# Patient Record
Sex: MALE | Race: WHITE
[De-identification: names, ages, dates, MRNs, and addresses within clinical notes are randomized per-mention and may not be internally consistent; named-entity substitution may affect disease eponyms.]

---

## 2017-08-28 ENCOUNTER — MEDICATION RENEWAL (OUTPATIENT)
Age: 52
End: 2017-08-28

## 2017-08-29 LAB
ALBUMIN SERPL ELPH-MCNC: 4.3 G/DL
ALP BLD-CCNC: 37 U/L
ALT SERPL-CCNC: 25 U/L
ANION GAP SERPL CALC-SCNC: 15 MMOL/L
AST SERPL-CCNC: 29 U/L
BASOPHILS # BLD AUTO: 0.02 K/UL
BASOPHILS NFR BLD AUTO: 0.4 %
BILIRUB SERPL-MCNC: 0.6 MG/DL
BUN SERPL-MCNC: 25 MG/DL
CALCIUM SERPL-MCNC: 10 MG/DL
CHLORIDE SERPL-SCNC: 103 MMOL/L
CHOLEST SERPL-MCNC: 163 MG/DL
CHOLEST/HDLC SERPL: 3.8 RATIO
CO2 SERPL-SCNC: 21 MMOL/L
CREAT SERPL-MCNC: 1.17 MG/DL
EOSINOPHIL # BLD AUTO: 0.14 K/UL
EOSINOPHIL NFR BLD AUTO: 2.8 %
GLUCOSE SERPL-MCNC: 91 MG/DL
HBA1C MFR BLD HPLC: 5.6 %
HCT VFR BLD CALC: 41.6 %
HDLC SERPL-MCNC: 43 MG/DL
HGB BLD-MCNC: 14.3 G/DL
IMM GRANULOCYTES NFR BLD AUTO: 0.2 %
LDLC SERPL CALC-MCNC: 102 MG/DL
LYMPHOCYTES # BLD AUTO: 1.38 K/UL
LYMPHOCYTES NFR BLD AUTO: 27.1 %
MAN DIFF?: NORMAL
MCHC RBC-ENTMCNC: 29.6 PG
MCHC RBC-ENTMCNC: 34.4 GM/DL
MCV RBC AUTO: 86.1 FL
MONOCYTES # BLD AUTO: 0.58 K/UL
MONOCYTES NFR BLD AUTO: 11.4 %
NEUTROPHILS # BLD AUTO: 2.96 K/UL
NEUTROPHILS NFR BLD AUTO: 58.1 %
PLATELET # BLD AUTO: 218 K/UL
POTASSIUM SERPL-SCNC: 4.5 MMOL/L
PROT SERPL-MCNC: 7.5 G/DL
PSA SERPL-MCNC: 0.51 NG/ML
RBC # BLD: 4.83 M/UL
RBC # FLD: 12.7 %
SODIUM SERPL-SCNC: 139 MMOL/L
TRIGL SERPL-MCNC: 88 MG/DL
WBC # FLD AUTO: 5.09 K/UL

## 2017-08-31 ENCOUNTER — RESULT CHARGE (OUTPATIENT)
Age: 52
End: 2017-08-31

## 2017-08-31 ENCOUNTER — NON-APPOINTMENT (OUTPATIENT)
Age: 52
End: 2017-08-31

## 2017-08-31 ENCOUNTER — APPOINTMENT (OUTPATIENT)
Dept: CARDIOLOGY | Facility: CLINIC | Age: 52
End: 2017-08-31
Payer: COMMERCIAL

## 2017-08-31 VITALS
SYSTOLIC BLOOD PRESSURE: 121 MMHG | BODY MASS INDEX: 33.75 KG/M2 | HEART RATE: 78 BPM | HEIGHT: 65.5 IN | OXYGEN SATURATION: 95 % | DIASTOLIC BLOOD PRESSURE: 78 MMHG | WEIGHT: 205 LBS

## 2017-08-31 PROCEDURE — 99214 OFFICE O/P EST MOD 30 MIN: CPT

## 2017-08-31 PROCEDURE — 93000 ELECTROCARDIOGRAM COMPLETE: CPT

## 2018-02-15 ENCOUNTER — CHART COPY (OUTPATIENT)
Age: 53
End: 2018-02-15

## 2018-09-17 ENCOUNTER — NON-APPOINTMENT (OUTPATIENT)
Age: 53
End: 2018-09-17

## 2018-09-17 ENCOUNTER — APPOINTMENT (OUTPATIENT)
Dept: CARDIOLOGY | Facility: CLINIC | Age: 53
End: 2018-09-17
Payer: COMMERCIAL

## 2018-09-17 VITALS
SYSTOLIC BLOOD PRESSURE: 144 MMHG | DIASTOLIC BLOOD PRESSURE: 90 MMHG | HEIGHT: 65.5 IN | OXYGEN SATURATION: 96 % | WEIGHT: 206 LBS | HEART RATE: 87 BPM | BODY MASS INDEX: 33.91 KG/M2

## 2018-09-17 PROCEDURE — 99214 OFFICE O/P EST MOD 30 MIN: CPT

## 2018-09-17 PROCEDURE — 93000 ELECTROCARDIOGRAM COMPLETE: CPT

## 2018-09-18 LAB
ALBUMIN SERPL ELPH-MCNC: 4.5 G/DL
ALP BLD-CCNC: 42 U/L
ALT SERPL-CCNC: 25 U/L
ANION GAP SERPL CALC-SCNC: 11 MMOL/L
AST SERPL-CCNC: 27 U/L
BILIRUB SERPL-MCNC: 0.4 MG/DL
BUN SERPL-MCNC: 16 MG/DL
CALCIUM SERPL-MCNC: 9.5 MG/DL
CHLORIDE SERPL-SCNC: 106 MMOL/L
CHOLEST SERPL-MCNC: 181 MG/DL
CHOLEST/HDLC SERPL: 4.2 RATIO
CO2 SERPL-SCNC: 25 MMOL/L
CREAT SERPL-MCNC: 1.05 MG/DL
GLUCOSE SERPL-MCNC: 123 MG/DL
HDLC SERPL-MCNC: 43 MG/DL
LDLC SERPL CALC-MCNC: 90 MG/DL
POTASSIUM SERPL-SCNC: 4.8 MMOL/L
PROT SERPL-MCNC: 7.5 G/DL
PSA FREE FLD-MCNC: 26.6
PSA FREE SERPL-MCNC: 0.21 NG/ML
PSA SERPL-MCNC: 0.79 NG/ML
SODIUM SERPL-SCNC: 142 MMOL/L
TRIGL SERPL-MCNC: 239 MG/DL

## 2018-10-10 ENCOUNTER — APPOINTMENT (OUTPATIENT)
Dept: CARDIOLOGY | Facility: CLINIC | Age: 53
End: 2018-10-10
Payer: COMMERCIAL

## 2018-10-10 PROCEDURE — 93306 TTE W/DOPPLER COMPLETE: CPT

## 2019-05-10 ENCOUNTER — OTHER (OUTPATIENT)
Age: 54
End: 2019-05-10

## 2019-06-28 ENCOUNTER — OTHER (OUTPATIENT)
Age: 54
End: 2019-06-28

## 2019-11-22 ENCOUNTER — OTHER (OUTPATIENT)
Age: 54
End: 2019-11-22

## 2019-12-16 ENCOUNTER — APPOINTMENT (OUTPATIENT)
Dept: CARDIOLOGY | Facility: CLINIC | Age: 54
End: 2019-12-16
Payer: COMMERCIAL

## 2019-12-16 ENCOUNTER — NON-APPOINTMENT (OUTPATIENT)
Age: 54
End: 2019-12-16

## 2019-12-16 VITALS — DIASTOLIC BLOOD PRESSURE: 78 MMHG | SYSTOLIC BLOOD PRESSURE: 113 MMHG | HEART RATE: 97 BPM | OXYGEN SATURATION: 97 %

## 2019-12-16 PROCEDURE — 93000 ELECTROCARDIOGRAM COMPLETE: CPT

## 2019-12-16 PROCEDURE — 99214 OFFICE O/P EST MOD 30 MIN: CPT

## 2020-08-07 ENCOUNTER — RX RENEWAL (OUTPATIENT)
Age: 55
End: 2020-08-07

## 2020-11-06 ENCOUNTER — APPOINTMENT (OUTPATIENT)
Dept: CARDIOLOGY | Facility: CLINIC | Age: 55
End: 2020-11-06
Payer: COMMERCIAL

## 2020-11-06 ENCOUNTER — NON-APPOINTMENT (OUTPATIENT)
Age: 55
End: 2020-11-06

## 2020-11-06 VITALS
WEIGHT: 212 LBS | HEART RATE: 86 BPM | DIASTOLIC BLOOD PRESSURE: 79 MMHG | BODY MASS INDEX: 34.9 KG/M2 | OXYGEN SATURATION: 98 % | HEIGHT: 65.5 IN | SYSTOLIC BLOOD PRESSURE: 129 MMHG

## 2020-11-06 PROCEDURE — 93000 ELECTROCARDIOGRAM COMPLETE: CPT

## 2020-11-06 PROCEDURE — 99214 OFFICE O/P EST MOD 30 MIN: CPT

## 2020-11-06 PROCEDURE — 99072 ADDL SUPL MATRL&STAF TM PHE: CPT

## 2020-11-07 LAB
ALBUMIN SERPL ELPH-MCNC: 4.7 G/DL
ALP BLD-CCNC: 50 U/L
ALT SERPL-CCNC: 33 U/L
ANION GAP SERPL CALC-SCNC: 11 MMOL/L
AST SERPL-CCNC: 28 U/L
BILIRUB SERPL-MCNC: 0.4 MG/DL
BUN SERPL-MCNC: 20 MG/DL
CALCIUM SERPL-MCNC: 9.8 MG/DL
CHLORIDE SERPL-SCNC: 105 MMOL/L
CHOLEST SERPL-MCNC: 105 MG/DL
CO2 SERPL-SCNC: 22 MMOL/L
CREAT SERPL-MCNC: 1.03 MG/DL
ESTIMATED AVERAGE GLUCOSE: 120 MG/DL
GLUCOSE SERPL-MCNC: 95 MG/DL
HBA1C MFR BLD HPLC: 5.8 %
HDLC SERPL-MCNC: 41 MG/DL
LDLC SERPL CALC-MCNC: 49 MG/DL
NONHDLC SERPL-MCNC: 64 MG/DL
POTASSIUM SERPL-SCNC: 4.5 MMOL/L
PROT SERPL-MCNC: 7.2 G/DL
PSA FREE FLD-MCNC: 33 %
PSA FREE SERPL-MCNC: 0.23 NG/ML
PSA SERPL-MCNC: 0.68 NG/ML
SODIUM SERPL-SCNC: 139 MMOL/L
TRIGL SERPL-MCNC: 77 MG/DL
TSH SERPL-ACNC: 1.59 UIU/ML

## 2021-12-13 ENCOUNTER — APPOINTMENT (OUTPATIENT)
Dept: OTOLARYNGOLOGY | Facility: CLINIC | Age: 56
End: 2021-12-13
Payer: COMMERCIAL

## 2021-12-13 VITALS
HEIGHT: 66 IN | SYSTOLIC BLOOD PRESSURE: 154 MMHG | DIASTOLIC BLOOD PRESSURE: 94 MMHG | HEART RATE: 75 BPM | BODY MASS INDEX: 31.34 KG/M2 | WEIGHT: 195 LBS

## 2021-12-13 DIAGNOSIS — H61.21 IMPACTED CERUMEN, RIGHT EAR: ICD-10-CM

## 2021-12-13 DIAGNOSIS — G50.1 ATYPICAL FACIAL PAIN: ICD-10-CM

## 2021-12-13 PROCEDURE — 31231 NASAL ENDOSCOPY DX: CPT

## 2021-12-13 PROCEDURE — 99245 OFF/OP CONSLTJ NEW/EST HI 55: CPT | Mod: 25

## 2021-12-13 PROCEDURE — 69210 REMOVE IMPACTED EAR WAX UNI: CPT

## 2021-12-13 NOTE — CONSULT LETTER
[Dear  ___] : Dear  [unfilled], [Consult Letter:] : I had the pleasure of evaluating your patient, [unfilled]. [Please see my note below.] : Please see my note below. [Consult Closing:] : Thank you very much for allowing me to participate in the care of this patient.  If you have any questions, please do not hesitate to contact me. [Sincerely,] : Sincerely, [FreeTextEntry3] : Rashel Nash MD FACS

## 2021-12-13 NOTE — ASSESSMENT
[FreeTextEntry1] : Reviewed and reconciled medications, allergies, PMHx, PSHx, SocHx, FMHx. \par \par h/o dysphagia\par right cerumen impaction \par Polyp coming out of right maxillary accessory ostium\par \par CT sinus 9/30/21: minimal mucosal thickening bilaterally in frontal sinus. minimally scattered mucosal thickening bilateral. completely opacified on the right with a polypoid component extending into the nasal cavity to the level of the nasopharynx, compatible with an antrochoanal polyp. left Tre's cell. \par \par Plan:\par Cerumen removed. Reviewed CT sinus. Rigid Nasal Endoscopy. Discussed r/b/a of right maxillary antrostomy with removal of antrochoanal polyp.

## 2021-12-13 NOTE — REVIEW OF SYSTEMS
[Seasonal Allergies] : seasonal allergies [Post Nasal Drip] : post nasal drip [Negative] : Heme/Lymph

## 2021-12-13 NOTE — PROCEDURE
[FreeTextEntry6] : Procedure: Rigid Nasal Endoscopy: Risks, benefits, and alternatives of rigid endoscopy were explained to the patient. The patient gave oral consent to proceed. The rigid scope was inserted into the right nasal cavity. Endoscopy of the inferior and middle meatus was performed. Mild deviation of septum. Polyp coming out of maxillary accessory ostium. Turbinates were without mass. The procedure was repeated on the contralateral side finding deviated septum.

## 2021-12-13 NOTE — HISTORY OF PRESENT ILLNESS
[de-identified] : The patient presents with h/o dysphagia. Pt presents to the ED for consultation for nasal polyp. Pt reports that he saw another ENT for an episode of short lived dysphagia that has since resolved. During the work up for the dysphagia, ENT incidentally found pt had nasal polyps. Pt reports that he had no trouble breathing through nose. Denies having cheek pain or pressure.

## 2021-12-13 NOTE — PHYSICAL EXAM
[] : septum deviated bilaterally [Normal] : mucosa is normal [Midline] : trachea located in midline position [Hearing Loss Right Only] : normal [Hearing Loss Left Only] : normal [FreeTextEntry8] : Cerumen impaction removed via curettage.  [FreeTextEntry9] : Cerumen removed via curettage.  [FreeTextEntry2] : Sinuses nontender to percussion.

## 2021-12-13 NOTE — ADDENDUM
[FreeTextEntry1] : Documented by Siddharth Corea acting as scribe for Dr. Nash on 12/13/2021.\par \par All Medical record entries made by the Scribe were at my, Dr. Nash, direction and personally dictated by me on 12/13/2021. I have reviewed the chart and agree that the record accurately reflects my personal performance of the history, physical exam, assessment and plan. I have also personally directed, reviewed, and agreed with the discharge instructions.

## 2022-01-31 ENCOUNTER — NON-APPOINTMENT (OUTPATIENT)
Age: 57
End: 2022-01-31

## 2022-01-31 ENCOUNTER — APPOINTMENT (OUTPATIENT)
Dept: CARDIOLOGY | Facility: CLINIC | Age: 57
End: 2022-01-31
Payer: COMMERCIAL

## 2022-01-31 VITALS
BODY MASS INDEX: 32.78 KG/M2 | HEART RATE: 73 BPM | WEIGHT: 204 LBS | DIASTOLIC BLOOD PRESSURE: 80 MMHG | HEIGHT: 66 IN | OXYGEN SATURATION: 97 % | SYSTOLIC BLOOD PRESSURE: 128 MMHG

## 2022-01-31 DIAGNOSIS — I25.10 ATHEROSCLEROTIC HEART DISEASE OF NATIVE CORONARY ARTERY W/OUT ANGINA PECTORIS: ICD-10-CM

## 2022-01-31 DIAGNOSIS — I10 ESSENTIAL (PRIMARY) HYPERTENSION: ICD-10-CM

## 2022-01-31 DIAGNOSIS — E78.5 HYPERLIPIDEMIA, UNSPECIFIED: ICD-10-CM

## 2022-01-31 PROCEDURE — 93000 ELECTROCARDIOGRAM COMPLETE: CPT

## 2022-01-31 PROCEDURE — 99214 OFFICE O/P EST MOD 30 MIN: CPT

## 2022-02-01 LAB
ALBUMIN SERPL ELPH-MCNC: 4.8 G/DL
ALP BLD-CCNC: 52 U/L
ALT SERPL-CCNC: 29 U/L
ANION GAP SERPL CALC-SCNC: 10 MMOL/L
AST SERPL-CCNC: 30 U/L
BILIRUB SERPL-MCNC: 0.5 MG/DL
BUN SERPL-MCNC: 18 MG/DL
CALCIUM SERPL-MCNC: 10.1 MG/DL
CHLORIDE SERPL-SCNC: 106 MMOL/L
CHOLEST SERPL-MCNC: 118 MG/DL
CO2 SERPL-SCNC: 25 MMOL/L
CREAT SERPL-MCNC: 1.08 MG/DL
ESTIMATED AVERAGE GLUCOSE: 114 MG/DL
GLUCOSE SERPL-MCNC: 109 MG/DL
HBA1C MFR BLD HPLC: 5.6 %
HDLC SERPL-MCNC: 45 MG/DL
LDLC SERPL CALC-MCNC: 58 MG/DL
NONHDLC SERPL-MCNC: 73 MG/DL
POTASSIUM SERPL-SCNC: 4.8 MMOL/L
PROT SERPL-MCNC: 7.5 G/DL
SODIUM SERPL-SCNC: 141 MMOL/L
TRIGL SERPL-MCNC: 72 MG/DL

## 2022-02-03 ENCOUNTER — APPOINTMENT (OUTPATIENT)
Dept: OTOLARYNGOLOGY | Facility: AMBULATORY MEDICAL SERVICES | Age: 57
End: 2022-02-03
Payer: COMMERCIAL

## 2022-02-03 ENCOUNTER — RESULT REVIEW (OUTPATIENT)
Age: 57
End: 2022-02-03

## 2022-02-03 PROCEDURE — 31267 ENDOSCOPY MAXILLARY SINUS: CPT | Mod: RT

## 2022-02-07 ENCOUNTER — RX RENEWAL (OUTPATIENT)
Age: 57
End: 2022-02-07

## 2022-02-11 ENCOUNTER — APPOINTMENT (OUTPATIENT)
Dept: OTOLARYNGOLOGY | Facility: CLINIC | Age: 57
End: 2022-02-11

## 2022-02-25 ENCOUNTER — APPOINTMENT (OUTPATIENT)
Dept: OTOLARYNGOLOGY | Facility: CLINIC | Age: 57
End: 2022-02-25
Payer: COMMERCIAL

## 2022-02-25 VITALS
DIASTOLIC BLOOD PRESSURE: 81 MMHG | BODY MASS INDEX: 32.78 KG/M2 | HEART RATE: 98 BPM | WEIGHT: 204 LBS | SYSTOLIC BLOOD PRESSURE: 136 MMHG | HEIGHT: 66 IN

## 2022-02-25 DIAGNOSIS — R06.83 SNORING: ICD-10-CM

## 2022-02-25 PROCEDURE — 31237 NSL/SINS NDSC SURG BX POLYPC: CPT

## 2022-02-25 PROCEDURE — 99213 OFFICE O/P EST LOW 20 MIN: CPT | Mod: 25

## 2022-02-25 RX ORDER — CEPHALEXIN 500 MG/1
500 CAPSULE ORAL
Qty: 20 | Refills: 0 | Status: COMPLETED | COMMUNITY
Start: 2022-02-03 | End: 2022-02-25

## 2022-02-25 RX ORDER — ACETAMINOPHEN AND CODEINE 300; 30 MG/1; MG/1
300-30 TABLET ORAL
Qty: 15 | Refills: 0 | Status: COMPLETED | COMMUNITY
Start: 2022-02-03 | End: 2022-02-25

## 2022-02-25 NOTE — ADDENDUM
[FreeTextEntry1] : Documented by Maira Woodward acting as scribe for Dr. Nash on 02/25/2022. \par \par All Medical record entries made by the scribe were at my. Dr. Nash direction and personally dictated by me on 02/25/2022. I have reviewed the chart and agree that the record accurately reflects my personal performance of the history, physical exam, assessment and plan. I have also personally directed, reviewed, and agreed with the discharge instructions.

## 2022-02-25 NOTE — PHYSICAL EXAM
[Normal] : no masses and lesions seen, face is symmetric [FreeTextEntry1] : crowded airway \par mild uvular edema \par some blood present  [FreeTextEntry2] : sinuses nontender to percussion

## 2022-02-25 NOTE — ASSESSMENT
[FreeTextEntry1] : Reviewed and reconciled medications, allergies, PMHx, PSHx, SocHx, FMHx. \par \par h/o dysphagia\par \par s/p right endoscopic antrochoanal polypectomy 02/03/2022\par \par Pathology:\par inverted sinonasal (schneiderian) papilloma \par inflammatory polyp \par needs constant following \par \par Plan:\par Reviewed pathology report. use sinus rinse - one bottle a day. can also use a since cone. 50% of the time spent consulting. FU 1 month - when inflammation is down will use scope to look inside sinuses. \par \par \par

## 2022-02-25 NOTE — HISTORY OF PRESENT ILLNESS
[de-identified] : The patient presents with h/o dysphagia. The patient presents today s/p right endoscopic antrochoanal polypectomy 02/03/2022. Pt reports using saline spray. Pt denies blowing his nose for the past month. Pt reports getting a burnt marshmallow taste sometimes. Pt states snoring a lot since after the surgery. Pt reports having severe nasal congestion when he lays down.

## 2022-02-25 NOTE — CONSULT LETTER
[Dear  ___] : Dear  [unfilled], [Courtesy Letter:] : I had the pleasure of seeing your patient, [unfilled], in my office today. [Please see my note below.] : Please see my note below. [Consult Closing:] : Thank you very much for allowing me to participate in the care of this patient.  If you have any questions, please do not hesitate to contact me. [Sincerely,] : Sincerely, [FreeTextEntry3] : Rashel Nash MD FACS

## 2022-02-27 ENCOUNTER — TRANSCRIPTION ENCOUNTER (OUTPATIENT)
Age: 57
End: 2022-02-27

## 2022-02-28 ENCOUNTER — TRANSCRIPTION ENCOUNTER (OUTPATIENT)
Age: 57
End: 2022-02-28

## 2022-03-28 ENCOUNTER — APPOINTMENT (OUTPATIENT)
Dept: OTOLARYNGOLOGY | Facility: CLINIC | Age: 57
End: 2022-03-28
Payer: COMMERCIAL

## 2022-03-28 VITALS
SYSTOLIC BLOOD PRESSURE: 141 MMHG | DIASTOLIC BLOOD PRESSURE: 93 MMHG | HEART RATE: 96 BPM | HEIGHT: 66 IN | BODY MASS INDEX: 32.95 KG/M2 | WEIGHT: 205 LBS

## 2022-03-28 DIAGNOSIS — J33.8 OTHER POLYP OF SINUS: ICD-10-CM

## 2022-03-28 DIAGNOSIS — H60.60 UNSPECIFIED CHRONIC OTITIS EXTERNA, UNSPECIFIED EAR: ICD-10-CM

## 2022-03-28 PROCEDURE — 99213 OFFICE O/P EST LOW 20 MIN: CPT | Mod: 25

## 2022-03-28 PROCEDURE — 31231 NASAL ENDOSCOPY DX: CPT

## 2022-03-28 RX ORDER — AZELASTINE HYDROCHLORIDE 137 UG/1
137 SPRAY, METERED NASAL
Qty: 30 | Refills: 0 | Status: ACTIVE | COMMUNITY
Start: 2022-02-26

## 2022-03-28 NOTE — PHYSICAL EXAM
[FreeTextEntry1] : sensations intact [Hearing Loss Right Only] : normal [Hearing Loss Left Only] : normal [FreeTextEntry8] : half filled with cerumen. cerumen removed via curettage [FreeTextEntry9] : minimal cerumen removed by curettage [Nasal Endoscopy Performed] : nasal endoscopy was performed, see procedure section for findings [Midline] : trachea located in midline position [Normal] : orientation to person, place, and time: normal

## 2022-03-28 NOTE — PROCEDURE
[Recalcitrant Symptoms] : recalcitrant symptoms  [Post-Op Patency] : post-op patency [Anterior rhinoscopy insufficient to account for symptoms] : anterior rhinoscopy insufficient to account for symptoms [None] : none [Flexible Endoscope] : examined with the flexible endoscope [___ cm] : [unfilled]Ucm maxillary polyp(s) on the right [Normal] : the middle meatus had no abnormalities [Paranasal Sinuses Ethmoid Sinus] : no ethmoid polyps [] : patent antrostomy on the right [FreeTextEntry6] : right side. wide open ostium  little blood. no evidence of significance disease. polypoid changes floor of sinus remaining mucosa looks nl [de-identified] : inverted papilloma

## 2022-03-28 NOTE — HISTORY OF PRESENT ILLNESS
[de-identified] : pt. s/p excision right antrochoanal poly 02/03/2021. patient reports continued use of nasal sinus rinse.  pathology inverted papilloma

## 2022-03-28 NOTE — ASSESSMENT
[FreeTextEntry1] : pt with hx of right antrochoanal polyp with pathology revealing inverted papilloma. no symptoms at this time. using sinus rinse. on exam flex nasendoscopy residual disease floor of right maxillary sinus\par \par plan  continue sinus rinse reexamine in 2 months if any change reexcision and bx in office.

## 2022-06-06 ENCOUNTER — APPOINTMENT (OUTPATIENT)
Dept: OTOLARYNGOLOGY | Facility: CLINIC | Age: 57
End: 2022-06-06
Payer: COMMERCIAL

## 2022-06-06 VITALS
HEART RATE: 86 BPM | DIASTOLIC BLOOD PRESSURE: 75 MMHG | BODY MASS INDEX: 32.14 KG/M2 | HEIGHT: 66 IN | WEIGHT: 200 LBS | SYSTOLIC BLOOD PRESSURE: 117 MMHG

## 2022-06-06 DIAGNOSIS — D36.9 BENIGN NEOPLASM, UNSPECIFIED SITE: ICD-10-CM

## 2022-06-06 DIAGNOSIS — T88.4XXD: ICD-10-CM

## 2022-06-06 DIAGNOSIS — Z86.018 PERSONAL HISTORY OF OTHER BENIGN NEOPLASM: ICD-10-CM

## 2022-06-06 PROCEDURE — 99214 OFFICE O/P EST MOD 30 MIN: CPT | Mod: 25

## 2022-06-06 PROCEDURE — 31231 NASAL ENDOSCOPY DX: CPT

## 2022-06-06 NOTE — ADDENDUM
[FreeTextEntry1] : Documented by Ca Rodrigues acting as scribe for Dr. Nash on 06/06/2022.\par \par All Medical record entries made by the scribe were at my, Dr. Nash,direction and personally dictated by me on 06/06/2022. I have reviewed the chart and agree that the record accurately reflects my personal performance of the history, physical exam, assessment and plan. I have also personally directed, reviewed, and agreed with the discharge instructions.

## 2022-06-06 NOTE — PROCEDURE
[Recalcitrant Symptoms] : recalcitrant symptoms  [Post-Op Patency] : post-op patency [None] : none [Flexible Endoscope] : examined with the flexible endoscope [FreeTextEntry6] : Procedure: Flexible Nasal Endoscopy: Risks, benefits, and alternatives of flexible endoscopy were explained to the patient. The patient gave oral consent to proceed. The flexible scope was inserted into the left nasal cavity. Endoscopy of the inferior and middle meatus was performed. No polyp, mass, or lesion was appreciated. Olfactory cleft was clear. Spheno-ethmoid recess is clear. Nasopharynx was clear. Turbinates were without mass. The procedure was repeated on the contralateral side with large polyp, residual disease floor of maxillary sinus [de-identified] : FU right antrochoanal polyp

## 2022-06-06 NOTE — ASSESSMENT
[FreeTextEntry1] : Reviewed and reconciled medications, allergies, PMHx, PSHx, SocHx, FMHx.\par \par s/p right endoscopic antrochoanal polypectomy (2/3/22) - path revealed inverted papilloma\par h/o dysphagia \par \par Flexible nasal endoscopy \par right: large polyp, residual disease floor of maxillary sinus\par left: clean and clear\par \par Plan:\par cerumen removed. Flexible nasal endoscopy. Discussed r/b/a of sinus surgery with flex LMA. continue Sinus rinse - 1 bottle per day. Schedule right maxillary antrostomy with removal of sinus contents surgery. FU after surgery.

## 2022-06-06 NOTE — HISTORY OF PRESENT ILLNESS
[de-identified] : Pt presents with s/p right antrochoanal polyp (2/3/22) with pathology revealing inverted papilloma. Pt presents with h/o dysphagia. Pt reports experiencing no symptoms at this time. Pt reports using sinus rinse. Pt reports seeing a drop of blood in sinus rinse sometimes from right nose, but not often. Pt reports having a sore throat after previous surgery.

## 2022-06-06 NOTE — PHYSICAL EXAM
[] : septum deviated to the left [Midline] : trachea located in midline position [Normal] : orientation to person, place, and time: normal [FreeTextEntry8] : cerumen removed via curettage  [FreeTextEntry9] : cerumen removed via curettage  [de-identified] : class 1

## 2022-06-13 ENCOUNTER — APPOINTMENT (OUTPATIENT)
Dept: OTOLARYNGOLOGY | Facility: CLINIC | Age: 57
End: 2022-06-13

## 2022-08-24 ENCOUNTER — RESULT REVIEW (OUTPATIENT)
Age: 57
End: 2022-08-24

## 2022-08-25 ENCOUNTER — APPOINTMENT (OUTPATIENT)
Dept: OTOLARYNGOLOGY | Facility: AMBULATORY MEDICAL SERVICES | Age: 57
End: 2022-08-25

## 2022-08-25 DIAGNOSIS — G89.18 OTHER ACUTE POSTPROCEDURAL PAIN: ICD-10-CM

## 2022-08-25 DIAGNOSIS — Z98.890 OTHER SPECIFIED POSTPROCEDURAL STATES: ICD-10-CM

## 2022-08-25 PROCEDURE — 31267 ENDOSCOPY MAXILLARY SINUS: CPT | Mod: RT

## 2022-09-09 ENCOUNTER — APPOINTMENT (OUTPATIENT)
Dept: OTOLARYNGOLOGY | Facility: CLINIC | Age: 57
End: 2022-09-09

## 2022-09-09 VITALS
SYSTOLIC BLOOD PRESSURE: 134 MMHG | BODY MASS INDEX: 31.34 KG/M2 | WEIGHT: 195 LBS | HEART RATE: 84 BPM | HEIGHT: 66 IN | DIASTOLIC BLOOD PRESSURE: 88 MMHG

## 2022-09-09 PROCEDURE — 31237 NSL/SINS NDSC SURG BX POLYPC: CPT | Mod: RT

## 2022-09-09 PROCEDURE — 99213 OFFICE O/P EST LOW 20 MIN: CPT | Mod: 25

## 2022-09-09 RX ORDER — CEPHALEXIN 500 MG/1
500 CAPSULE ORAL
Qty: 20 | Refills: 0 | Status: DISCONTINUED | COMMUNITY
Start: 2022-08-25 | End: 2022-09-09

## 2022-09-09 RX ORDER — ACETAMINOPHEN AND CODEINE 300; 30 MG/1; MG/1
300-30 TABLET ORAL
Qty: 8 | Refills: 0 | Status: DISCONTINUED | COMMUNITY
Start: 2022-08-25 | End: 2022-09-09

## 2022-09-09 NOTE — PHYSICAL EXAM
[Normal] : no masses and lesions seen, face is symmetric [FreeTextEntry2] : sinuses nontender to percussion

## 2022-09-09 NOTE — PROCEDURE
[FreeTextEntry1] : Rigid Nasal Endoscopy w/ Right Sinus Debridement [FreeTextEntry2] : post op patency  [FreeTextEntry3] : Procedure: Rigid Nasal Endoscopy w/ Right Sinus Debridement: Risks, benefits, and alternatives of rigid endoscopy were explained to the patient. The patient gave oral consent to proceed. The 0 degree scope was inserted into the right nasal cavity. Endoscopy of the inferior and middle meatus was performed. Spheno-ethmoid recess clear. Nasopharynx was clear. Turbinates were without mass. Middle meatus and sinus debridement done. The patient tolerated the procedure well.

## 2022-09-09 NOTE — ASSESSMENT
[FreeTextEntry1] : Reviewed and reconciled medications, allergies, PMHx, PSHx, SocHx, FMHx. \par \par s/p right antrochoanal polyp (2/3/22) with pathology revealing inverted papilloma, dysphagia.\par The patient presents today s/p right endoscopic maxillary antrostomy with removal of sinus content 08/25/2022 \par \par path - inverted schneiderian papilloma - no sign of malignancy \par \par Plan:\par Reviewed pathology report. continue using sinus rinse. Rigid Nasal Endoscopy w/ Right Sinus Debridement. FU 3-4 months

## 2022-09-09 NOTE — ADDENDUM
[FreeTextEntry1] : Documented by Maira Woodward acting as scribe for Dr. Nash on 09/09/2022. \par \par All Medical record entries made by the scribe were at my. Dr. Nash direction and personally dictated by me on 09/09/2022. I have reviewed the chart and agree that the record accurately reflects my personal performance of the history, physical exam, assessment and plan. I have also personally directed, reviewed, and agreed with the discharge instructions.

## 2022-09-09 NOTE — HISTORY OF PRESENT ILLNESS
[de-identified] : The patient presents with s/p right antrochoanal polyp (2/3/22) with pathology revealing inverted papilloma, dysphagia. The patient presents today s/p right endoscopic maxillary antrostomy with removal of sinus content 08/25/2022. Pt reports to be feeling okay. Pt states to be using sinus rinse  20-Mar-2018 11:39

## 2022-10-07 ENCOUNTER — APPOINTMENT (OUTPATIENT)
Dept: OTOLARYNGOLOGY | Facility: CLINIC | Age: 57
End: 2022-10-07

## 2022-10-07 VITALS
DIASTOLIC BLOOD PRESSURE: 89 MMHG | BODY MASS INDEX: 31.34 KG/M2 | WEIGHT: 195 LBS | HEART RATE: 93 BPM | SYSTOLIC BLOOD PRESSURE: 136 MMHG | HEIGHT: 66 IN

## 2022-10-07 DIAGNOSIS — J34.2 DEVIATED NASAL SEPTUM: ICD-10-CM

## 2022-10-07 DIAGNOSIS — Z98.890 OTHER SPECIFIED POSTPROCEDURAL STATES: ICD-10-CM

## 2022-10-07 PROCEDURE — 31231 NASAL ENDOSCOPY DX: CPT

## 2022-10-07 PROCEDURE — 99213 OFFICE O/P EST LOW 20 MIN: CPT | Mod: 25

## 2022-10-07 NOTE — PROCEDURE
[Post-Op Patency] : post-op patency [None] : none [FreeTextEntry3] : Procedure: Nasal Cauterization. After topical 4% xylocaine and oxymetazoline, the nasal vault was re-evaluated. Bleeding site identified. Cauterized with silver nitrate. Post-procedure instructions given. Patient tolerated procedure well [FreeTextEntry6] : Procedure: Rigid Nasal Endoscopy: Risks, benefits, and alternatives of rigid endoscopy were explained to the patient. The patient gave oral consent to proceed. The rigid scope was inserted into the right nasal cavity. Endoscopy of the inferior and middle meatus was performed. No polyp, mass, or lesion was appreciated. Olfactory cleft was clear. sinus is wide open and clear, some scabbing present but no signs of any growth. Nasopharynx was clear. \par \par Procedure: Flexible Nasal Endoscopy: Risks, benefits, and alternatives of flexible endoscopy were explained to the patient. The patient gave oral consent to proceed. The flexible scope was inserted into the right nasal cavity. Endoscopy of the inferior and middle meatus was performed. No polyp, mass, or lesion was appreciated. Olfactory cleft was clear. large scab. Nasopharynx was clear.

## 2022-10-07 NOTE — HISTORY OF PRESENT ILLNESS
[de-identified] : The patient presents with s/p right antrochoanal polyp (2/3/22) with pathology revealing inverted papilloma, dysphagia. The patient presents today s/p right endoscopic maxillary antrostomy with removal of sinus content 08/25/2022. The patient presents today for 1 month follow up. Pt reports do be doing well.

## 2022-10-07 NOTE — ADDENDUM
[FreeTextEntry1] : Documented by Maira Woodward acting as scribe for Dr. Nash on 10/07/2022. \par \par All Medical record entries made by the scribe were at my. Dr. Nash direction and personally dictated by me on 10/07/2022. I have reviewed the chart and agree that the record accurately reflects my personal performance of the history, physical exam, assessment and plan. I have also personally directed, reviewed, and agreed with the discharge instructions.

## 2022-10-07 NOTE — ASSESSMENT
[FreeTextEntry1] : Reviewed and reconciled medications, allergies, PMHx, PSHx, SocHx, FMHx. \par \par s/p right antrochoanal polyp (2/3/22) with pathology revealing inverted papilloma, dysphagia. The patient presents today s/p right endoscopic maxillary antrostomy with removal of sinus content 08/25/2022\par The patient presents today for 1 month follow up \par \par Rigid nasal endoscopy - \par sinus is wide open and clear, some scabbing present but no signs of any growth\par \par Flexible nasal endoscopy - large scab \par \par Plan:\par continue sinus rinse and use steam in shower. Flexible nasal endoscopy. Rigid nasal endoscopy. FU 1 month

## 2022-10-28 ENCOUNTER — OFFICE (OUTPATIENT)
Dept: URBAN - METROPOLITAN AREA CLINIC 70 | Facility: CLINIC | Age: 57
Setting detail: OPHTHALMOLOGY
End: 2022-10-28
Payer: COMMERCIAL

## 2022-10-28 DIAGNOSIS — H40.013: ICD-10-CM

## 2022-10-28 DIAGNOSIS — H52.7: ICD-10-CM

## 2022-10-28 DIAGNOSIS — H35.62: ICD-10-CM

## 2022-10-28 PROCEDURE — 92004 COMPRE OPH EXAM NEW PT 1/>: CPT | Performed by: OPHTHALMOLOGY

## 2022-10-28 PROCEDURE — 92015 DETERMINE REFRACTIVE STATE: CPT | Performed by: OPHTHALMOLOGY

## 2022-10-28 PROCEDURE — 92250 FUNDUS PHOTOGRAPHY W/I&R: CPT | Performed by: OPHTHALMOLOGY

## 2022-10-28 ASSESSMENT — REFRACTION_AUTOREFRACTION
OD_SPHERE: +1.75
OS_AXIS: 087
OD_AXIS: 088
OS_CYLINDER: -1.00
OS_SPHERE: +2.00
OD_CYLINDER: -1.00

## 2022-10-28 ASSESSMENT — AXIALLENGTH_DERIVED
OS_AL: 23.2032
OS_AL: 23.1094
OD_AL: 23.3877
OD_AL: 23.2924

## 2022-10-28 ASSESSMENT — KERATOMETRY
OD_K1POWER_DIOPTERS: 42.75
OS_K1POWER_DIOPTERS: 43.00
OD_AXISANGLE_DEGREES: 166
OS_AXISANGLE_DEGREES: 014
OD_K2POWER_DIOPTERS: 43.25
OS_K2POWER_DIOPTERS: 43.50

## 2022-10-28 ASSESSMENT — TONOMETRY
OS_IOP_MMHG: 17
OD_IOP_MMHG: 16

## 2022-10-28 ASSESSMENT — CONFRONTATIONAL VISUAL FIELD TEST (CVF)
OS_FINDINGS: FULL
OD_FINDINGS: FULL

## 2022-10-28 ASSESSMENT — REFRACTION_MANIFEST
OD_VA1: 20/20
OS_SPHERE: +1.50
OU_VA: 20/20
OD_CYLINDER: -0.50
OD_AXIS: 90
OS_VA1: 20/20
OD_ADD: +2.25
OS_ADD: +2.25
OD_SPHERE: +1.25
OS_AXIS: 90
OS_CYLINDER: -0.50

## 2022-10-28 ASSESSMENT — REFRACTION_CURRENTRX
OD_SPHERE: +1.75
OS_SPHERE: +1.75
OS_OVR_VA: 20/
OD_OVR_VA: 20/

## 2022-10-28 ASSESSMENT — SPHEQUIV_DERIVED
OD_SPHEQUIV: 1
OD_SPHEQUIV: 1.25
OS_SPHEQUIV: 1.5
OS_SPHEQUIV: 1.25

## 2022-10-28 ASSESSMENT — VISUAL ACUITY
OS_BCVA: 20/50
OD_BCVA: 20/50+2

## 2022-11-18 ENCOUNTER — APPOINTMENT (OUTPATIENT)
Dept: OTOLARYNGOLOGY | Facility: CLINIC | Age: 57
End: 2022-11-18

## 2022-11-18 ENCOUNTER — NON-APPOINTMENT (OUTPATIENT)
Age: 57
End: 2022-11-18

## 2022-11-18 VITALS
DIASTOLIC BLOOD PRESSURE: 79 MMHG | BODY MASS INDEX: 31.34 KG/M2 | SYSTOLIC BLOOD PRESSURE: 117 MMHG | HEIGHT: 66 IN | WEIGHT: 195 LBS | HEART RATE: 98 BPM

## 2022-11-18 DIAGNOSIS — D14.0 BENIGN NEOPLASM OF MIDDLE EAR, NASAL CAVITY AND ACCESSORY SINUSES: ICD-10-CM

## 2022-11-18 DIAGNOSIS — J31.0 CHRONIC RHINITIS: ICD-10-CM

## 2022-11-18 DIAGNOSIS — J33.9 NASAL POLYP, UNSPECIFIED: ICD-10-CM

## 2022-11-18 DIAGNOSIS — J32.9 CHRONIC SINUSITIS, UNSPECIFIED: ICD-10-CM

## 2022-11-18 PROCEDURE — 31231 NASAL ENDOSCOPY DX: CPT

## 2022-11-18 PROCEDURE — 99214 OFFICE O/P EST MOD 30 MIN: CPT | Mod: 25

## 2022-11-18 NOTE — ASSESSMENT
[FreeTextEntry1] : Reviewed and reconciled medications, allergies, PMHx, PSHx, SocHx, FMHx. \par \par s/p right antrochoanal polyp (2/3/22) with pathology revealing inverted papilloma, dysphagia. The patient presents today s/p right endoscopic maxillary antrostomy with removal of sinus content 08/25/2022\par The patient presents today for 1 month follow up \par \par prior Rigid nasal endoscopy 10/07/2022- \par sinus is wide open and clear, some scabbing present but no signs of any growth\par \par prior Flexible nasal endoscopy 10/07/2022 - large scab \par \par Flexible nasal endoscopy - \par right side: granulation and mild erythema with mild papilloma \par \par Plan:\par continue sinus rinse and use steam in shower. Flexible nasal endoscopy. Discussed r/b/a of papilloma needs to be removed. FU after surgery

## 2022-11-18 NOTE — HISTORY OF PRESENT ILLNESS
[de-identified] : The patient presents with s/p right antrochoanal polyp (2/3/22) with pathology revealing inverted papilloma, dysphagia. The patient presents today s/p right endoscopic maxillary antrostomy with removal of sinus content 08/25/2022. The patient presents today for 1 month follow up. Pt reports do be doing well.

## 2022-11-18 NOTE — PROCEDURE
[Recalcitrant Symptoms] : recalcitrant symptoms  [None] : none [FreeTextEntry6] : Procedure: Flexible Nasal Endoscopy: Risks, benefits, and alternatives of flexible endoscopy were explained to the patient. The patient gave oral consent to proceed. The flexible scope was inserted into the right nasal cavity. Endoscopy of the inferior and middle meatus was performed. No polyp, mass, or lesion was appreciated. Olfactory cleft was clear. right side: granulation and mild erythema with mild papilloma   [de-identified] : large scab

## 2022-11-18 NOTE — ADDENDUM
[FreeTextEntry1] : Documented by Maira Woodward acting as scribe for Dr. Nash on 11/18/2022. \par \par All Medical record entries made by the scribe were at my. Dr. Nash direction and personally dictated by me on 11/18/2022. I have reviewed the chart and agree that the record accurately reflects my personal performance of the history, physical exam, assessment and plan. I have also personally directed, reviewed, and agreed with the discharge instructions.

## 2023-02-27 ENCOUNTER — APPOINTMENT (OUTPATIENT)
Dept: GASTROENTEROLOGY | Facility: CLINIC | Age: 58
End: 2023-02-27
Payer: COMMERCIAL

## 2023-02-27 VITALS
DIASTOLIC BLOOD PRESSURE: 94 MMHG | BODY MASS INDEX: 32.95 KG/M2 | WEIGHT: 205 LBS | SYSTOLIC BLOOD PRESSURE: 143 MMHG | HEIGHT: 66 IN | HEART RATE: 99 BPM

## 2023-02-27 DIAGNOSIS — Z12.11 ENCOUNTER FOR SCREENING FOR MALIGNANT NEOPLASM OF COLON: ICD-10-CM

## 2023-02-27 PROCEDURE — 99203 OFFICE O/P NEW LOW 30 MIN: CPT

## 2023-02-27 RX ORDER — SODIUM SULFATE, POTASSIUM SULFATE AND MAGNESIUM SULFATE 1.6; 3.13; 17.5 G/177ML; G/177ML; G/177ML
17.5-3.13-1.6 SOLUTION ORAL
Qty: 1 | Refills: 0 | Status: ACTIVE | COMMUNITY
Start: 2023-02-27 | End: 1900-01-01

## 2023-02-27 NOTE — PHYSICAL EXAM
[Alert] : alert [Healthy Appearing] : healthy appearing [Sclera] : the sclera and conjunctiva were normal [Bowel Sounds] : normal bowel sounds [Abdomen Tenderness] : non-tender [Abdomen Soft] : soft [Abnormal Walk] : normal gait [Normal Color / Pigmentation] : normal skin color and pigmentation [Oriented To Time, Place, And Person] : oriented to person, place, and time

## 2023-02-28 NOTE — HISTORY OF PRESENT ILLNESS
[FreeTextEntry1] : Tobi Loyd is a 57 year old male PMH HTN and HLD presents today for consult for screening colonoscopy. Last procedure was between 8 and 9 years ago with another physician, reportedly normal. Denies FMH of CRC. Denies bowel complaints, typically has bowel movements regularly without significant straining or overt bleeding such as melena or hematochezia. Denies upper GI symptoms such as GERD, nausea, vomiting, or dysphagia. Denies unintentional weight loss.

## 2023-02-28 NOTE — ASSESSMENT
[FreeTextEntry1] : Plan:\par Since pt is due, will recommend colonoscopy for screening purposes. Risks versus benefits as well as instructions reviewed, pt agrees to planned procedure. all questions answered, pt expressed understanding. Discussed with Dr. Chowdhury, I have spent 35 minutes on this encounter.

## 2023-04-19 ENCOUNTER — NON-APPOINTMENT (OUTPATIENT)
Age: 58
End: 2023-04-19

## 2023-06-30 ENCOUNTER — APPOINTMENT (OUTPATIENT)
Dept: GASTROENTEROLOGY | Facility: AMBULATORY MEDICAL SERVICES | Age: 58
End: 2023-06-30

## 2023-07-05 ENCOUNTER — RX RENEWAL (OUTPATIENT)
Age: 58
End: 2023-07-05

## 2024-02-24 ENCOUNTER — OFFICE (OUTPATIENT)
Dept: URBAN - METROPOLITAN AREA CLINIC 70 | Facility: CLINIC | Age: 59
Setting detail: OPHTHALMOLOGY
End: 2024-02-24
Payer: COMMERCIAL

## 2024-02-24 DIAGNOSIS — H35.62: ICD-10-CM

## 2024-02-24 DIAGNOSIS — H35.033: ICD-10-CM

## 2024-02-24 DIAGNOSIS — H25.13: ICD-10-CM

## 2024-02-24 DIAGNOSIS — H40.013: ICD-10-CM

## 2024-02-24 PROCEDURE — 92014 COMPRE OPH EXAM EST PT 1/>: CPT | Performed by: OPHTHALMOLOGY

## 2024-02-24 PROCEDURE — 92250 FUNDUS PHOTOGRAPHY W/I&R: CPT | Performed by: OPHTHALMOLOGY

## 2024-02-24 PROCEDURE — 92083 EXTENDED VISUAL FIELD XM: CPT | Performed by: OPHTHALMOLOGY

## 2024-02-24 ASSESSMENT — REFRACTION_MANIFEST
OS_SPHERE: +1.50
OD_VA1: 20/20
OS_ADD: +2.25
OD_SPHERE: +1.25
OS_AXIS: 90
OS_VA1: 20/20
OD_ADD: +2.25
OD_CYLINDER: -0.50
OS_CYLINDER: -0.50
OU_VA: 20/20
OD_AXIS: 90

## 2024-02-24 ASSESSMENT — REFRACTION_CURRENTRX
OD_SPHERE: +1.50
OD_OVR_VA: 20/
OD_AXIS: 097
OS_OVR_VA: 20/
OD_ADD: +2.50
OS_VPRISM_DIRECTION: PROGS
OD_CYLINDER: -0.50
OS_AXIS: 069
OS_CYLINDER: -0.50
OS_ADD: +2.75
OS_SPHERE: +1.50
OD_VPRISM_DIRECTION: PROGS

## 2024-02-24 ASSESSMENT — SPHEQUIV_DERIVED
OD_SPHEQUIV: 1
OS_SPHEQUIV: 1.375
OS_SPHEQUIV: 1.25
OD_SPHEQUIV: 1

## 2024-02-24 ASSESSMENT — REFRACTION_AUTOREFRACTION
OS_CYLINDER: -0.75
OS_SPHERE: +1.75
OD_AXIS: 082
OS_AXIS: 080
OD_CYLINDER: -0.50
OD_SPHERE: +1.25

## 2024-02-24 ASSESSMENT — CONFRONTATIONAL VISUAL FIELD TEST (CVF)
OD_FINDINGS: FULL
OS_FINDINGS: FULL